# Patient Record
Sex: MALE | Race: WHITE | URBAN - METROPOLITAN AREA
[De-identification: names, ages, dates, MRNs, and addresses within clinical notes are randomized per-mention and may not be internally consistent; named-entity substitution may affect disease eponyms.]

---

## 2017-04-28 ENCOUNTER — MEDICAL CORRESPONDENCE (OUTPATIENT)
Dept: HEALTH INFORMATION MANAGEMENT | Facility: CLINIC | Age: 82
End: 2017-04-28

## 2017-05-16 ENCOUNTER — OFFICE VISIT (OUTPATIENT)
Dept: OPHTHALMOLOGY | Facility: CLINIC | Age: 82
End: 2017-05-16

## 2017-05-16 DIAGNOSIS — H02.9 EYELID LESION: Primary | ICD-10-CM

## 2017-05-16 DIAGNOSIS — C44.131: ICD-10-CM

## 2017-05-16 RX ORDER — LIDOCAINE 50 MG/G
1 PATCH TOPICAL EVERY 24 HOURS
COMMUNITY

## 2017-05-16 RX ORDER — POLYVINYL ALCOHOL 14 MG/ML
1 SOLUTION/ DROPS OPHTHALMIC PRN
COMMUNITY

## 2017-05-16 RX ORDER — NITROGLYCERIN 80 MG/1
1 PATCH TRANSDERMAL DAILY
COMMUNITY

## 2017-05-16 ASSESSMENT — SLIT LAMP EXAM - LIDS: COMMENTS: NORMAL

## 2017-05-16 ASSESSMENT — VISUAL ACUITY
CORRECTION_TYPE: GLASSES
OD_CC+: -2
METHOD: SNELLEN - LINEAR
OS_CC+: -1
OS_CC: 20/40
OD_CC: 20/20

## 2017-05-16 ASSESSMENT — TONOMETRY
OD_IOP_MMHG: 8
IOP_METHOD: ICARE
OS_IOP_MMHG: 7

## 2017-05-16 ASSESSMENT — CONF VISUAL FIELD
OD_NORMAL: 1
OS_NORMAL: 1
METHOD: COUNTING FINGERS

## 2017-05-16 NOTE — PROGRESS NOTES
Chief Complaints and History of Present Illnesses   Patient presents with     Consult For     sebaceous cell carcinoma of the eyelid.     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 Had pentagonal wedge on 4/17/17 which came back as sebaceous cell carcinoma of the eyelid. Pt reports the area was being monitored for 3-5yrs prior to biopsies. Denies any pain, drainage, or other sx. Does report mild irritation following biopsy 4/2017. Using erythro ointment over incision site.         Assessment & Plan     Helder Stanford is a 89 year old male with the following diagnoses:   1. 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 Vasquez who is caring for Helder Stanford at the [facility], and she was able to fill me in on his history. She is seeing him next week and plans to proceed with map biopsy of the involved side. Her current plan was to defer any further metastatic workup, which I agree with.     I discussed the care plan with the patient and reassured him that he is in excellent hands at the [facility] with Dr. Vasquez, and he was in agreement. At this point proceed with map biopsy with Dr. Vasquez, and I am happy to see him back on an as needed basis.            Complete documentation of historical and exam elements from today's encounter can be found in the full encounter summary report (not reduplicated in this progress note). I personally obtained the chief complaint(s) and history of present illness.  I confirmed and edited as necessary the review of systems, past medical/surgical history, family history, social history, and examination findings as documented by others; and I examined the patient myself. I personally reviewed the relevant tests, images, and reports as documented above. I formulated and edited  as necessary the assessment and plan and discussed the findings and management plan with the patient and family. - Lluvia Woodall MD

## 2017-05-16 NOTE — MR AVS SNAPSHOT
After Visit Summary   5/16/2017    Helder Stanford    MRN: [id number]           Patient Information     Date Of Birth          11/25/1925        Visit Information        Provider Department      5/16/2017 12:45 PM Lluvia Woodall MD [facility] Ophthalmology        Today's Diagnoses     Eyelid lesion    -  1    Sebaceous adenocarcinoma of left eyelid           Follow-ups after your visit        Who to contact     Please call your clinic at [phone] to:    Ask questions about your health    Make or cancel appointments    Discuss your medicines    Learn about your test results    Speak to your doctor   If you have compliments or concerns about an experience at your clinic, or if you wish to file a complaint, please contact [facility] Patient Relations at [phone] or email us at [email or web address]         Additional Information About Your Visit        MyChart Information     [employer] gives you secure access to your electronic health record. If you see a primary care provider, you can also send messages to your care team and make appointments. If you have questions, please call your primary care clinic.  If you do not have a primary care provider, please call [phone] and they will assist you.      [facility] is an electronic gateway that provides easy, online access to your medical records. With [facility], you can request a clinic appointment, read your test results, renew a prescription or communicate with your care team.     To access your existing account, please contact your [facility] or call [phone] for assistance.        Care EveryWhere ID     This is your Care EveryWhere ID. This could be used by other organizations to access your [place] medical records  [id number]         Blood Pressure from Last 3 Encounters:   No data found for BP    Weight from Last 3 Encounters:   No data found for Wt               Today, you had the following     No orders found for display       Primary Care Provider Office Phone # Fax #    Valerio Graham [phone]       [facility] [address]   [facility] [address]        Thank you!     Thank you for choosing [facility] OPHTHALMOLOGY  for your care. Our goal is always to provide you with excellent care. Hearing back from our patients is one way we can continue to improve our services. Please take a few minutes to complete the written survey that you may receive in the mail after your visit with us. Thank you!             Your Updated Medication List - Protect others around you: Learn how to safely use, store and throw away your medicines at www.disposemymeds.org.          This list is accurate as of: 5/16/17  2:40 PM.  Always use your most recent med list.                   Brand Name Dispense Instructions for use    Acetaminophen 500 MG Chew          aspirin 81 MG tablet      Take by mouth daily       ATORVASTATIN CALCIUM PO          BUMETANIDE PO          calcium carb 1250 mg (500 mg [place]vitamin D 200 units 500-200 MG-UNIT per tablet    OSCAL with D     Take 1 tablet by mouth 2 times daily (with meals)       lidocaine 5 % Patch    LIDODERM     Place 1 patch onto the skin every 24 hours       MELATONIN PO          nitroglycerin 0.4 MG/HR 24 hr patch    NITRODUR     Place 1 patch onto the skin daily       OMEPRAZOLE PO          polyvinyl alcohol 1.4 % ophthalmic solution    LIQUIFILM TEARS     1 drop as needed       potassium chloride 10 mEq/100 mL IV with 10 mg lidocaine PEDS      Inject into the vein every hour as needed       psyllium 58.6 % Powd    METAMUCIL     Take by mouth daily       SODIUM CHLORIDE (INHALANT) IN          Sodium Fluoride 0.15 % Pste

## 2017-05-16 NOTE — NURSING NOTE
Chief Complaints and History of Present Illnesses   Patient presents with     Consult For     sebaceous cell carcinoma of the eyelid.     HPI    Affected eye(s):  Left   Symptoms:     No blurred vision      Frequency:  Constant       Do you have eye pain now?:  Yes   Location:  OS   Pain Duration:  3 weeks   Pain Frequency:  Constant      Comments:  Patient states that the left eye feels like gravel in it when the ointment is on in. Has not used ointment today. Using daily at least 3 times.    Shelby VELEZ 1:03 PM May 16, 2017

## 2017-05-30 ENCOUNTER — HOSPITAL PATHOLOGY (OUTPATIENT)
Dept: OTHER | Facility: CLINIC | Age: 82
End: 2017-05-30

## 2017-06-07 LAB — COPATH REPORT: NORMAL

## 2018-05-29 ENCOUNTER — OFFICE VISIT (OUTPATIENT)
Dept: OPHTHALMOLOGY | Facility: CLINIC | Age: 83
End: 2018-05-29
Payer: COMMERCIAL

## 2018-05-29 DIAGNOSIS — H02.9 EYELID LESION: Primary | ICD-10-CM

## 2018-05-29 DIAGNOSIS — C44.131: ICD-10-CM

## 2018-05-29 RX ORDER — ERYTHROMYCIN 5 MG/G
1 OINTMENT OPHTHALMIC AT BEDTIME
Qty: 1 TUBE | Refills: 0 | OUTPATIENT
Start: 2018-05-29

## 2018-05-29 RX ORDER — LIDOCAINE HYDROCHLORIDE AND EPINEPHRINE 15; 5 MG/ML; UG/ML
10 INJECTION, SOLUTION EPIDURAL ONCE
Qty: 1 ML | Refills: 0 | OUTPATIENT
Start: 2018-05-29 | End: 2018-05-29

## 2018-05-29 ASSESSMENT — SLIT LAMP EXAM - LIDS: COMMENTS: BLEPHARITIS

## 2018-05-29 ASSESSMENT — CONF VISUAL FIELD
OS_NORMAL: 1
OD_NORMAL: 1

## 2018-05-29 ASSESSMENT — TONOMETRY
OD_IOP_MMHG: 09
OS_IOP_MMHG: 09
IOP_METHOD: ICARE

## 2018-05-29 ASSESSMENT — VISUAL ACUITY
OD_CC+: -3
OD_CC: 20/20
CORRECTION_TYPE: GLASSES
METHOD: SNELLEN - LINEAR
OS_CC: 20/20
OS_CC+: -1

## 2018-05-29 NOTE — MR AVS SNAPSHOT
After Visit Summary   5/29/2018    Helder Stanford    MRN: [id number]           Patient Information     Date Of Birth          11/25/1925        Visit Information        Provider Department      5/29/2018 1:15 PM Lluvia Woodall MD [facility] Ophthalmology        Today's Diagnoses     Eyelid lesion    -  1    Sebaceous adenocarcinoma of left eyelid          Care Instructions    Post-operative Instructions  Ophthalmic Plastic and Reconstructive Surgery    Lluvia Woodall M.D.     All instructions apply to the operated eye(s) or eyelid(s).    Wound care and personal care  ? If a patch or bandage has been placed, please leave this in place until seen by your physician. Ensure that the bandage does not get wet when you take a shower.  ? Apply ice compresses 15 minutes on 15 minutes off while awake for 2 days, then switch to warm water compresses 4 times a day until seen by your physician. For warm packs you can place a cup of dry uncooked rice in a clean cotton sock. Then place sock in microwave 30 seconds to one minute. Next place the warm sock into a plastic bag and wrap the bag with clean warm wet washcloth and place over operated eye.    ? You may shower or wash your hair the day after surgery. Do not bathe or go swimming for 1 week to prevent contamination of your wounds.  ? Do not apply make-up to the eyes or eyelids for 2 weeks after surgery.  ? Expect some swelling, bruising, black eye (even into the lower eyelids and cheeks). Also expect serum caking, crusting and discharge from the eye and/or incisions. A small amount of surface bleeding is normal for the first 48 hours.  ? Your eye(s) and eyelid(s) may be painful and tender. This is normal after surgery.  Use the pain medication as prescribed. If your pain does not improve despite the  medication, contact the office.    Contact information and follow-up  ? Return to the Eye Clinic for a follow-up appointment with your physician  as  scheduled. If no appointment has been scheduled:   - [facility] eye clinic: [phone] for an appointment with Dr. Woodall within 1 to 2 weeks from your date of surgery.   -  [facility] eye clinic: [phone] for an appointment with Dr. Woodall within 1 to 2 weeks from your date of surgery.     ? For severe pain, bleeding, or loss of vision, call the [facility] Clinic at [phone] or [facility] at [phone].     After hours or on weekends and holidays, call [phone] and ask to speak with the ophthalmologist on call.    An on call person can be reached after hours for concerns. The on call doctor should not call in medication refill requests after hours or on weekends, so please plan accordingly. An effort has been made to provide adequate pain medications following every surgery, and refills will not be provided in most instances. Narcotic pain medications cannot be called in.     Activity restrictions and driving  ? Avoid heavy lifting, bending, exercise or strenuous activity for 1 week after surgery.  You may resume other activities and return to work as tolerated.  ? You may not resume driving until have you stopped using narcotic pain medications (such as Norco, Percocet, Tylenol #3).    Medications  ? Restart all your regular home medications and eye drops. If you take Plavix or  Aspirin on a regular basis, wait for 72 hours after your surgery before restarting these in order to decrease the risk of bleeding complications.  ? Avoid aspirin and aspirin-like medications (Motrin, Aleve, Ibuprofen, Allison-  [place] etc) for 72 hours to reduce the risk of bleeding. You may take Tylenol  (acetaminophen) for pain.  ? In addition to your home medications, take the following post-operative medications as prescribed by your physician.    ? Apply antibiotic ointment to all sutures three times a day, and into the operated eye(s) at  night.              Follow-ups after your visit        Follow-up notes from your care team     Return for 2 weeks AM clinic.      Your next 10 appointments already scheduled     Jun 12, 2018  1:30 PM CDT   (Arrive by 1:15 PM)   Post-Op with Lluvia Woodall MD   [facility] Ophthalmology ([facility])    [address]  [address] [place] [facility] [address] [phone]              Who to contact     Please call your clinic at [phone] to:    Ask questions about your health    Make or cancel appointments    Discuss your medicines    Learn about your test results    Speak to your doctor            Additional Information About Your Visit        [employer] Information     [employer] gives you secure access to your electronic health record. If you see a primary care provider, you can also send messages to your care team and make appointments. If you have questions, please call your primary care clinic.  If you do not have a primary care provider, please call [phone] and they will assist you.      [employer] is an electronic gateway that provides easy, online access to your medical records. With [employer], you can request a clinic appointment, read your test results, renew a prescription or communicate with your care team.     To access your existing account, please contact your [facility] or call [phone] for assistance.        Care EveryWhere ID     This is your Care EveryWhere ID. This could be used by other organizations to access your [place] medical records  [id number]         Blood Pressure from Last 3 Encounters:   No data found for BP    Weight from Last 3 Encounters:   No data found for Wt              We Performed the Following     Wedge resetion less than 25% of eyelid        Primary Care Provider Office Phone # Fax #    Valerio Graham [phone]       [facility] [address]   [facility] [address]        Equal  Access to Services     [facility]: Hadii aad ku hadvaleriaperez Kenan, wadwightda luqadelina, qapaulinata kamichellegutierrez griffith. So [facility] [phone].    ATENCIÓN: Si habla español, tiene a murrell disposición servicios gratuitos de asistencia lingüística. Llame al [phone].    We comply with applicable federal civil rights laws and [place] laws. We do not discriminate on the basis of race, color, national origin, age, disability, sex, sexual orientation, or gender identity.            Thank you!     Thank you for choosing [facility] OPHTHALMOLOGY  for your care. Our goal is always to provide you with excellent care. Hearing back from our patients is one way we can continue to improve our services. Please take a few minutes to complete the written survey that you may receive in the mail after your visit with us. Thank you!             Your Updated Medication List - Protect others around you: Learn how to safely use, store and throw away your medicines at www.disposemymeds.org.          This list is accurate as of 5/29/18  2:34 PM.  Always use your most recent med list.                   Brand Name Dispense Instructions for use Diagnosis    Acetaminophen 500 MG Chew           aspirin 81 MG tablet      Take by mouth daily        ATORVASTATIN CALCIUM PO           BUMETANIDE PO           Calcium carb-Vitamin D 500 mg [place]-200 units 500-200 MG-UNIT per tablet    OSCAL with D;Oyster Shell Calcium     Take 1 tablet by mouth 2 times daily (with meals)        cetirizine HCl 10 MG Caps           FLUTICASONE PROPIONATE (INHAL) IN           lidocaine 5 % Patch    LIDODERM     Place 1 patch onto the skin every 24 hours        MELATONIN PO           nitroGLYcerin 0.4 MG/HR 24 hr patch    NITRODUR     Place 1 patch onto the skin daily        OMEPRAZOLE PO           polyvinyl alcohol 1.4 % ophthalmic solution    LIQUIFILM TEARS     1 drop as needed        potassium chloride 10 mEq/100 mL IV with 10 mg  lidocaine PEDS      Inject into the vein every hour as needed        psyllium 58.6 % Powd    METAMUCIL     Take by mouth daily        SODIUM CHLORIDE (INHALANT) IN           Sodium Fluoride 0.15 % Pste           TAMSULOSIN HCL PO

## 2018-05-29 NOTE — PATIENT INSTRUCTIONS
Post-operative Instructions  Ophthalmic Plastic and Reconstructive Surgery    Lluvia Woodall M.D.     All instructions apply to the operated eye(s) or eyelid(s).    Wound care and personal care  ? If a patch or bandage has been placed, please leave this in place until seen by your physician. Ensure that the bandage does not get wet when you take a shower.  ? Apply ice compresses 15 minutes on 15 minutes off while awake for 2 days, then switch to warm water compresses 4 times a day until seen by your physician. For warm packs you can place a cup of dry uncooked rice in a clean cotton sock. Then place sock in microwave 30 seconds to one minute. Next place the warm sock into a plastic bag and wrap the bag with clean warm wet washcloth and place over operated eye.    ? You may shower or wash your hair the day after surgery. Do not bathe or go swimming for 1 week to prevent contamination of your wounds.  ? Do not apply make-up to the eyes or eyelids for 2 weeks after surgery.  ? Expect some swelling, bruising, black eye (even into the lower eyelids and cheeks). Also expect serum caking, crusting and discharge from the eye and/or incisions. A small amount of surface bleeding is normal for the first 48 hours.  ? Your eye(s) and eyelid(s) may be painful and tender. This is normal after surgery.  Use the pain medication as prescribed. If your pain does not improve despite the  medication, contact the office.    Contact information and follow-up  ? Return to the Eye Clinic for a follow-up appointment with your physician as  scheduled. If no appointment has been scheduled:   - [facility] eye clinic: [phone] for an appointment with Dr. Woodall within 1 to 2 weeks from your date of surgery.   -  [facility] eye clinic: [phone] for an appointment with Dr. Woodall within 1 to 2 weeks from your date of surgery.     ? For severe pain, bleeding, or loss of vision, call the [facility]  [place] [facility] at [phone] or [facility] at [phone].     After hours or on weekends and holidays, call [phone] and ask to speak with the ophthalmologist on call.    An on call person can be reached after hours for concerns. The on call doctor should not call in medication refill requests after hours or on weekends, so please plan accordingly. An effort has been made to provide adequate pain medications following every surgery, and refills will not be provided in most instances. Narcotic pain medications cannot be called in.     Activity restrictions and driving  ? Avoid heavy lifting, bending, exercise or strenuous activity for 1 week after surgery.  You may resume other activities and return to work as tolerated.  ? You may not resume driving until have you stopped using narcotic pain medications (such as Norco, Percocet, Tylenol #3).    Medications  ? Restart all your regular home medications and eye drops. If you take Plavix or  Aspirin on a regular basis, wait for 72 hours after your surgery before restarting these in order to decrease the risk of bleeding complications.  ? Avoid aspirin and aspirin-like medications (Motrin, Aleve, Ibuprofen, Allison-  [place] etc) for 72 hours to reduce the risk of bleeding. You may take Tylenol  (acetaminophen) for pain.  ? In addition to your home medications, take the following post-operative medications as prescribed by your physician.    ? Apply antibiotic ointment to all sutures three times a day, and into the operated eye(s) at night.

## 2018-05-29 NOTE — NURSING NOTE
"Chief Complaints and History of Present Illnesses   Patient presents with     Follow Up For     1 year f/u Eyelid lesion     HPI    Affected eye(s):  Left   Symptoms:     No blurred vision   No itching   No burning         Do you have eye pain now?:  No      Comments:    Patient notes he was supposed to f/u with Dr. Vasquez because she left one lesion \"unattended\" and felt that he was waiting too long as Dr. Vasquez was on maternity leave.     LE lesion that has been present, patient notes he hasn't noticed the lesion when it was found and he still doesn't notice it but states Dr. Vasquez is adament it be checked.    Gracy Leach May 29, 2018 1:11 PM               "

## 2018-05-29 NOTE — PROGRESS NOTES
Chief Complaints and History of Present Illnesses   Patient presents with     Consult For     sebaceous cell carcinoma of the eyelid.     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 Had pentagonal wedge on 4/17/17 which came back as sebaceous cell carcinoma of the eyelid. Was seen by Dr. Vasquez who performed map biopsy. Patient denies having any ocular symptoms at this point but wanted to know what the status of his biopsies.       Assessment & Plan     Helder Stanford is a 89 year old male with the following diagnoses:   1. 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 Vasquez's [facility] notes from September 2017 indicated that the patient did not want to proceed with topical Mytomycin C    Will plan for superior medial tarsal conj wedge in clinic today, patient agrees to proceed with biopsy    Follow up in 2 weeks    Homer Jc MD  PGY-2 Ophthalmology  [phone]  Agree    PREOPERATIVE DIAGNOSIS: Left upper eyelid mass  POSTOPERATIVE DIAGNOSIS: Left upper eyelid mass  PROCEDURE: Wedge resection and reconstruction, left  upper eyelid (greater than 25% of eyelid margin).   SURGEON: Lluvia Woodall MD   ASSISTANT: Homer Jc MD  ANESTHESIA: Local infiltration of 2% lidocaine with epinephrine  COMPLICATIONS: None.   ESTIMATED BLOOD LOSS: Less than 5 cc.   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 After the risks, benefits and alternatives to the proposed procedure were explained Informed Consent was obtained from  the patient.   DESCRIPTION OF PROCEDURE: The left  Upper lid was infiltrated with local anesthetic. The patient was prepped and draped in the typical fashion. Attention was directed to the left side. The area of the mass was outlined in a pentagonal wedge. This was excised with a Elda scissors. About 1/3rd of the upper eyelid was excised - extending from just lateral to the punctum to the central eyelid. Hemostasis was obtained with a high-temperature cautery. The margin was reconstructed with an internal buried vertical mattress 6-0 Vicryl suture. Additional Vicryl sutures were placed at the lash line, and the tarsal plate was closed with partial thickness 5-0 Vicryl sutures. The skin was closed with interrupted 6-0 plain gut sutures. Erythromycin ophthalmic ointment was applied. The patient tolerated the procedure well and left the operating room in stable condition.   MD BRANDON Chappell was present for the entire procedure. Lluvia Woodall MD

## 2018-06-05 LAB — COPATH REPORT: NORMAL

## 2018-06-12 ENCOUNTER — OFFICE VISIT (OUTPATIENT)
Dept: OPHTHALMOLOGY | Facility: CLINIC | Age: 83
End: 2018-06-12
Payer: COMMERCIAL

## 2018-06-12 DIAGNOSIS — C44.131: Primary | ICD-10-CM

## 2018-06-12 ASSESSMENT — VISUAL ACUITY
CORRECTION_TYPE: GLASSES
OS_CC+: -3
OS_CC: 20/25
OD_CC+: -1
OD_CC: 20/20
METHOD: SNELLEN - LINEAR

## 2018-06-12 ASSESSMENT — TONOMETRY
OD_IOP_MMHG: 09
OS_IOP_MMHG: 09
IOP_METHOD: ICARE

## 2018-06-12 NOTE — PROGRESS NOTES
Helder Stanford is status post left upper eyelid pentagonal wedge.  Incision(s) healing well.  Healed nicely  Path: No carcinoma  I have recommended:  * Continue antibiotic ointment or bland lubricating ointment (eg vaseline or aquaphor) to the incision site BID.  * Massage along the incision 2-3 x daily.   * Warm soaks 3-4x daily until all edema and ecchymoses resolve  * Return to clinic in 6 months (for f/u sebaceous cell carcinoma), continue f/u at [facility] for routine eye care    Attending Physician Attestation:  Complete documentation of historical and exam elements from today's encounter can be found in the full encounter summary report (not reduplicated in this progress note).  I personally obtained the chief complaint(s) and history of present illness.  I confirmed and edited as necessary the review of systems, past medical/surgical history, family history, social history, and examination findings as documented by others; and I examined the patient myself.  I personally reviewed the relevant tests, images, and reports as documented above.  I formulated and edited as necessary the assessment and plan and discussed the findings and management plan with the patient and family. - Lluvia Woodall MD

## 2018-06-12 NOTE — NURSING NOTE
Chief Complaints and History of Present Illnesses   Patient presents with     Post Op (Ophthalmology) Left Eye     POW#2 s/p Wedge resection and reconstruction, left  upper eyelid (greater than 25% of eyelid margin).      HPI    Affected eye(s):  Left   Symptoms:     No blurred vision   No itching   No burning   No eye discharge         Do you have eye pain now?:  No      Comments:    Patient notes he has been using ernesto TID on incision and qhs     Gracy Leach June 12, 2018 1:59 PM

## 2018-06-12 NOTE — MR AVS SNAPSHOT
After Visit Summary   6/12/2018    Helder Stanford    MRN: [id number]           Patient Information     Date Of Birth          11/25/1925        Visit Information        Provider Department      6/12/2018 1:30 PM Lluvia Woodall MD [facility]        Today's Diagnoses     Sebaceous adenocarcinoma of left eyelid    -  1       Follow-ups after your visit        Follow-up notes from your care team     Return in about 6 months (around 12/12/2018) for f/u sebaceous cell carcinoma .      Your next 10 appointments already scheduled     Dec 18, 2018  1:00 PM CST   (Arrive by 12:45 PM)   RETURN PLASTICS with Lluvia Woodall MD   [facility] ([facility] [place])    [address]  [address] [place] [facility] [address] [phone]              Who to contact     Please call your clinic at [phone] to:    Ask questions about your health    Make or cancel appointments    Discuss your medicines    Learn about your test results    Speak to your doctor            Additional Information About Your Visit        MyChart Information     [employer] gives you secure access to your electronic health record. If you see a primary care provider, you can also send messages to your care team and make appointments. If you have questions, please call your primary care clinic.  If you do not have a primary care provider, please call [phone] and they will assist you.      [employer] is an electronic gateway that provides easy, online access to your medical records. With [employer], you can request a clinic appointment, read your test results, renew a prescription or communicate with your care team.     To access your existing account, please contact your [facility] or call [phone] for assistance.        Care EveryWhere ID     This is your Care EveryWhere ID. This could be used by other organizations to access your [facility]  records  [id number]         Blood Pressure from Last 3 Encounters:   No data found for BP    Weight from Last 3 Encounters:   No data found for Wt              Today, you had the following     No orders found for display       Primary Care Provider Office Phone # Fax #    Valerio Graham [phone]       [facility] [address] [place] [address]        Equal Access to Services     [facility]: Hadii aad ku hadasho Soomaali, waaxda luqadaha, qaybta kaalmada adeegyada, waxay idiin hayaan adeeg kharash la'aan . So [facility] [phone].    ATENCIÓN: Si habla español, tiene a murrell disposición servicios gratuitos de asistencia lingüística. Roxie al [phone].    We comply with applicable federal civil rights laws and [place] laws. We do not discriminate on the basis of race, color, national origin, age, disability, sex, sexual orientation, or gender identity.            Thank you!     Thank you for choosing [facility]  for your care. Our goal is always to provide you with excellent care. Hearing back from our patients is one way we can continue to improve our services. Please take a few minutes to complete the written survey that you may receive in the mail after your visit with us. Thank you!             Your Updated Medication List - Protect others around you: Learn how to safely use, store and throw away your medicines at www.disposemymeds.org.          This list is accurate as of 6/12/18  2:32 PM.  Always use your most recent med list.                   Brand Name Dispense Instructions for use Diagnosis    Acetaminophen 500 MG Chew           aspirin 81 MG tablet      Take by mouth daily        ATORVASTATIN CALCIUM PO           BUMETANIDE PO           Calcium carb-Vitamin D 500 mg [place]-200 units 500-200 MG-UNIT per tablet    OSCAL with D;Oyster Shell Calcium     Take 1 tablet by mouth 2 times daily (with meals)        cetirizine HCl 10 MG Caps           erythromycin  ophthalmic ointment    ROMYCIN    1 Tube    Place 1 Application Into the left eye At Bedtime    Eyelid lesion, Sebaceous adenocarcinoma of left eyelid       FLUTICASONE PROPIONATE (INHAL) IN           lidocaine 5 % Patch    LIDODERM     Place 1 patch onto the skin every 24 hours        MELATONIN PO           nitroGLYcerin 0.4 MG/HR 24 hr patch    NITRODUR     Place 1 patch onto the skin daily        OMEPRAZOLE PO           polyvinyl alcohol 1.4 % ophthalmic solution    LIQUIFILM TEARS     1 drop as needed        potassium chloride 10 mEq/100 mL IV with 10 mg lidocaine PEDS      Inject into the vein every hour as needed        psyllium 58.6 % Powd    METAMUCIL     Take by mouth daily        SODIUM CHLORIDE (INHALANT) IN           Sodium Fluoride 0.15 % Pste           TAMSULOSIN HCL PO

## 2018-10-19 ENCOUNTER — RECORDS - HEALTHEAST (OUTPATIENT)
Dept: LAB | Facility: CLINIC | Age: 83
End: 2018-10-19

## 2018-10-22 LAB
ANION GAP SERPL CALCULATED.3IONS-SCNC: 11 MMOL/L (ref 5–18)
BUN SERPL-MCNC: 37 MG/DL (ref 8–28)
CALCIUM SERPL-MCNC: 9.7 MG/DL (ref 8.5–10.5)
CHLORIDE BLD-SCNC: 97 MMOL/L (ref 98–107)
CO2 SERPL-SCNC: 34 MMOL/L (ref 22–31)
CREAT SERPL-MCNC: 1.02 MG/DL (ref 0.7–1.3)
GFR SERPL CREATININE-BSD FRML MDRD: >60 ML/MIN/1.73M2
GLUCOSE BLD-MCNC: 138 MG/DL (ref 70–125)
POTASSIUM BLD-SCNC: 3.4 MMOL/L (ref 3.5–5)
SODIUM SERPL-SCNC: 142 MMOL/L (ref 136–145)
TSH SERPL DL<=0.005 MIU/L-ACNC: 3.72 UIU/ML (ref 0.3–5)

## 2018-11-02 ENCOUNTER — RECORDS - HEALTHEAST (OUTPATIENT)
Dept: LAB | Facility: CLINIC | Age: 83
End: 2018-11-02

## 2018-11-02 LAB — POTASSIUM BLD-SCNC: 3.9 MMOL/L (ref 3.5–5)

## 2018-12-21 ENCOUNTER — TELEPHONE (OUTPATIENT)
Dept: OPHTHALMOLOGY | Facility: CLINIC | Age: 83
End: 2018-12-21

## 2019-09-28 ENCOUNTER — HEALTH MAINTENANCE LETTER (OUTPATIENT)
Age: 84
End: 2019-09-28

## 2020-03-15 ENCOUNTER — HEALTH MAINTENANCE LETTER (OUTPATIENT)
Age: 85
End: 2020-03-15

## 2021-01-10 ENCOUNTER — HEALTH MAINTENANCE LETTER (OUTPATIENT)
Age: 86
End: 2021-01-10

## 2021-05-08 ENCOUNTER — HEALTH MAINTENANCE LETTER (OUTPATIENT)
Age: 86
End: 2021-05-08

## 2021-10-23 ENCOUNTER — HEALTH MAINTENANCE LETTER (OUTPATIENT)
Age: 86
End: 2021-10-23

## 2022-06-04 ENCOUNTER — HEALTH MAINTENANCE LETTER (OUTPATIENT)
Age: 87
End: 2022-06-04

## 2022-10-09 ENCOUNTER — HEALTH MAINTENANCE LETTER (OUTPATIENT)
Age: 87
End: 2022-10-09

## 2023-06-10 ENCOUNTER — HEALTH MAINTENANCE LETTER (OUTPATIENT)
Age: 88
End: 2023-06-10

## (undated) RX ORDER — LIDOCAINE HYDROCHLORIDE AND EPINEPHRINE 15; 5 MG/ML; UG/ML
INJECTION, SOLUTION EPIDURAL
Status: DISPENSED
Start: 2018-05-29

## (undated) RX ORDER — ERYTHROMYCIN 5 MG/G
OINTMENT OPHTHALMIC
Status: DISPENSED
Start: 2018-05-29